# Patient Record
Sex: MALE | Race: BLACK OR AFRICAN AMERICAN | NOT HISPANIC OR LATINO | URBAN - METROPOLITAN AREA
[De-identification: names, ages, dates, MRNs, and addresses within clinical notes are randomized per-mention and may not be internally consistent; named-entity substitution may affect disease eponyms.]

---

## 2017-04-13 ENCOUNTER — OFFICE (OUTPATIENT)
Dept: URBAN - METROPOLITAN AREA CLINIC 11 | Facility: CLINIC | Age: 37
End: 2017-04-13

## 2017-04-13 VITALS
HEART RATE: 57 BPM | HEIGHT: 70 IN | RESPIRATION RATE: 16 BRPM | SYSTOLIC BLOOD PRESSURE: 132 MMHG | DIASTOLIC BLOOD PRESSURE: 80 MMHG | WEIGHT: 176 LBS

## 2017-04-13 DIAGNOSIS — R10.12 LEFT UPPER QUADRANT PAIN: ICD-10-CM

## 2017-04-13 DIAGNOSIS — K21.9 GASTRO-ESOPHAGEAL REFLUX DISEASE WITHOUT ESOPHAGITIS: ICD-10-CM

## 2017-04-13 PROCEDURE — 36415 COLL VENOUS BLD VENIPUNCTURE: CPT | Performed by: INTERNAL MEDICINE

## 2017-04-13 PROCEDURE — 99214 OFFICE O/P EST MOD 30 MIN: CPT | Performed by: INTERNAL MEDICINE

## 2017-04-13 NOTE — SERVICEHPINOTES
He stated taht he has been having a pain in the LUQ (a focal area) that has been present for over a year.  It was worse over New Years.  He stated that he stopped drinking ETOH and it stopped for about a month.  He restarted a about a month later and the symptoms returned.  The pain is fairly constant and with a "sharp sticking" pain.  It is not changed with eating.  He has stopped drinking the pint of vodka (every 1-2 days) and the symptoms have lessened but are still present.  He has had several CTs in years past that were normal with the last CT in 2016 of his chest.  He has been on Zegerid for reflux and did not think that it helped the LUQ pain.He has had reflux for several years and ahs been on Zegerid for several years, possibly since 2009.  He has noted that he has felt full earlier than in the past.  He has not had issues with dysphagia.  He has had some regurgitation at times after eating.  He has not had any particualr foods that make the reflux worse.    He has a history of asbestos exposure in 2016 and was seen at the [facility]. His old chart in the system was reviewed with him.

## 2017-04-13 NOTE — SERVICENOTES
We discussed his LUQ pain, which seems fairly chronic, the relationship to the vodka, the history of reflux, his use of Zegerid and prior evaluations and studies.  This would seem to be muscular but with the chronic GERD (since before 2009) and association with ETOH, I would do the EGD to r/o gastritis/ulcer disease/Allan's screening. I will also add his labs and a u/s to r/o pancreatic/splenic issues.  We discussed his calcifications on prostate u/s years ago and at the time of the abdominal u/s, I would like a f/u pelvic u/s.  We discussed a f/u with urology as well.

## 2017-04-14 LAB
AMYLASE: AMYLASE,SERUM: 97 U/L (ref 28–120)
CBCI COMPLETE BLOOD COUNT W/ DIFF: ABS BASOPHILS: 0 K/UL (ref 0–0.3)
CBCI COMPLETE BLOOD COUNT W/ DIFF: ABS EOSINOPHILS: 0.2 K/UL (ref 0.05–0.5)
CBCI COMPLETE BLOOD COUNT W/ DIFF: ABS LYMPHOCYTES: 1.9 K/UL (ref 1–4)
CBCI COMPLETE BLOOD COUNT W/ DIFF: ABS MONOCYTES: 0.4 K/UL (ref 0.1–1.1)
CBCI COMPLETE BLOOD COUNT W/ DIFF: ABS NEUTROPHILS: 1.4 K/UL — LOW (ref 1.8–7)
CBCI COMPLETE BLOOD COUNT W/ DIFF: BASOPHILS: 0.8 % (ref 0–3)
CBCI COMPLETE BLOOD COUNT W/ DIFF: EOSINOPHILS: 4.5 % (ref 1–7)
CBCI COMPLETE BLOOD COUNT W/ DIFF: HEMATOCRIT: 42.6 % (ref 39–55)
CBCI COMPLETE BLOOD COUNT W/ DIFF: HEMOGLOBIN: 14.1 G/DL (ref 13–17.5)
CBCI COMPLETE BLOOD COUNT W/ DIFF: LYMPHOCYTES: 49.2 % — HIGH (ref 20–48)
CBCI COMPLETE BLOOD COUNT W/ DIFF: MCH: 28.6 PG (ref 25–35)
CBCI COMPLETE BLOOD COUNT W/ DIFF: MCHC: 33.1 % (ref 30–38)
CBCI COMPLETE BLOOD COUNT W/ DIFF: MCV: 86.4 FL (ref 78–102)
CBCI COMPLETE BLOOD COUNT W/ DIFF: MONOCYTES: 9.2 % (ref 3–14)
CBCI COMPLETE BLOOD COUNT W/ DIFF: NEUTROPHILS: 36.3 % — LOW (ref 40–70)
CBCI COMPLETE BLOOD COUNT W/ DIFF: PLATELET COUNT: 221 K/UL (ref 150–450)
CBCI COMPLETE BLOOD COUNT W/ DIFF: RBC DISTRIBUTION WIDTH: 13.5 % (ref 11.5–16)
CBCI COMPLETE BLOOD COUNT W/ DIFF: RED BLOOD CELL COUNT: 4.93 M/UL (ref 4.3–5.7)
CBCI COMPLETE BLOOD COUNT W/ DIFF: WHITE BLOOD CELL COUNT: 3.8 K/UL — LOW (ref 4–11)
COMPREHENSIVE METABOLIC PANEL: ALBUMIN: 4.3 G/DL (ref 3.5–5.2)
COMPREHENSIVE METABOLIC PANEL: ALKALINE PHOSPHATASE: 50 U/L (ref 46–118)
COMPREHENSIVE METABOLIC PANEL: CALCIUM TOTAL: 9.7 MG/DL (ref 8.5–10.5)
COMPREHENSIVE METABOLIC PANEL: CARBON DIOXIDE: 28 MEQ/L (ref 21–31)
COMPREHENSIVE METABOLIC PANEL: CHLORIDE: 104 MEQ/L (ref 96–106)
COMPREHENSIVE METABOLIC PANEL: CREATININE: 1.11 MG/DL (ref 0.8–1.4)
COMPREHENSIVE METABOLIC PANEL: FASTING/NON-FASTING: (no result)
COMPREHENSIVE METABOLIC PANEL: GLUCOSE: 80 MG/DL (ref 65–100)
COMPREHENSIVE METABOLIC PANEL: POTASSIUM: 4.7 MEQ/ML (ref 3.5–5.4)
COMPREHENSIVE METABOLIC PANEL: SGOT (AST): 28 U/L (ref 13–40)
COMPREHENSIVE METABOLIC PANEL: SGPT (ALT): 19 U/L (ref 7–52)
COMPREHENSIVE METABOLIC PANEL: SODIUM: 142 MEQ/L (ref 135–145)
COMPREHENSIVE METABOLIC PANEL: TOTAL BILIRUBIN: 0.5 MG/DL (ref 0.3–1.2)
COMPREHENSIVE METABOLIC PANEL: TOTAL PROTEIN: 7 G/DL (ref 6.4–8.3)
COMPREHENSIVE METABOLIC PANEL: UREA NITROGEN: 14 MG/DL (ref 6–20)
LIPASE: 47 U/L (ref 11–82)
SED RATE - ERYTHROCYTE SED RATE: SED RATE: 2 MM/HR

## 2017-05-04 ENCOUNTER — AMBULATORY SURGICAL CENTER (OUTPATIENT)
Dept: URBAN - METROPOLITAN AREA SURGERY 3 | Facility: SURGERY | Age: 37
End: 2017-05-04

## 2017-05-04 ENCOUNTER — OFFICE (OUTPATIENT)
Dept: URBAN - METROPOLITAN AREA CLINIC 11 | Facility: CLINIC | Age: 37
End: 2017-05-04

## 2017-05-04 VITALS
RESPIRATION RATE: 18 BRPM | HEART RATE: 63 BPM | TEMPERATURE: 97.1 F | DIASTOLIC BLOOD PRESSURE: 83 MMHG | DIASTOLIC BLOOD PRESSURE: 87 MMHG | SYSTOLIC BLOOD PRESSURE: 125 MMHG | HEART RATE: 51 BPM | OXYGEN SATURATION: 97 % | RESPIRATION RATE: 16 BRPM | RESPIRATION RATE: 16 BRPM | OXYGEN SATURATION: 98 % | HEIGHT: 70 IN | HEART RATE: 51 BPM | RESPIRATION RATE: 19 BRPM | SYSTOLIC BLOOD PRESSURE: 116 MMHG | DIASTOLIC BLOOD PRESSURE: 50 MMHG | DIASTOLIC BLOOD PRESSURE: 87 MMHG | DIASTOLIC BLOOD PRESSURE: 83 MMHG | SYSTOLIC BLOOD PRESSURE: 115 MMHG | HEART RATE: 63 BPM | RESPIRATION RATE: 18 BRPM | TEMPERATURE: 97.1 F | DIASTOLIC BLOOD PRESSURE: 58 MMHG | SYSTOLIC BLOOD PRESSURE: 116 MMHG | HEIGHT: 70 IN | WEIGHT: 175 LBS | TEMPERATURE: 97.2 F | WEIGHT: 175 LBS | SYSTOLIC BLOOD PRESSURE: 125 MMHG | RESPIRATION RATE: 19 BRPM | SYSTOLIC BLOOD PRESSURE: 139 MMHG | HEART RATE: 56 BPM | HEART RATE: 56 BPM | OXYGEN SATURATION: 100 % | SYSTOLIC BLOOD PRESSURE: 115 MMHG | OXYGEN SATURATION: 100 % | DIASTOLIC BLOOD PRESSURE: 71 MMHG | TEMPERATURE: 97.2 F | DIASTOLIC BLOOD PRESSURE: 50 MMHG | SYSTOLIC BLOOD PRESSURE: 139 MMHG | OXYGEN SATURATION: 97 % | DIASTOLIC BLOOD PRESSURE: 71 MMHG | OXYGEN SATURATION: 98 % | DIASTOLIC BLOOD PRESSURE: 58 MMHG

## 2017-05-04 DIAGNOSIS — K29.50 UNSPECIFIED CHRONIC GASTRITIS WITHOUT BLEEDING: ICD-10-CM

## 2017-05-04 DIAGNOSIS — K52.9 NONINFECTIVE GASTROENTERITIS AND COLITIS, UNSPECIFIED: ICD-10-CM

## 2017-05-04 DIAGNOSIS — B96.81 HELICOBACTER PYLORI [H. PYLORI] AS THE CAUSE OF DISEASES CLA: ICD-10-CM

## 2017-05-04 DIAGNOSIS — R10.31 RIGHT LOWER QUADRANT PAIN: ICD-10-CM

## 2017-05-04 PROBLEM — K29.70 GASTRITIS, UNSPECIFIED, WITHOUT BLEEDING: Status: ACTIVE | Noted: 2017-05-04

## 2017-05-04 PROCEDURE — 43239 EGD BIOPSY SINGLE/MULTIPLE: CPT | Performed by: INTERNAL MEDICINE

## 2017-05-04 PROCEDURE — 88342 IMHCHEM/IMCYTCHM 1ST ANTB: CPT | Performed by: INTERNAL MEDICINE

## 2017-05-04 PROCEDURE — 88305 TISSUE EXAM BY PATHOLOGIST: CPT | Performed by: INTERNAL MEDICINE

## 2017-05-04 PROCEDURE — 88313 SPECIAL STAINS GROUP 2: CPT | Performed by: INTERNAL MEDICINE

## 2017-05-04 RX ORDER — OMEPRAZOLE 40 MG/1
40 CAPSULE, DELAYED RELEASE ORAL
Qty: 30 | Refills: 3 | Status: COMPLETED
Start: 2016-06-02 | End: 2017-05-04

## 2017-05-04 RX ORDER — PANTOPRAZOLE SODIUM 40 MG/1
TABLET, DELAYED RELEASE ORAL
Qty: 60 | Refills: 1 | Status: COMPLETED
Start: 2017-05-04 | End: 2017-08-22

## 2017-08-22 ENCOUNTER — OFFICE (OUTPATIENT)
Dept: URBAN - METROPOLITAN AREA CLINIC 11 | Facility: CLINIC | Age: 37
End: 2017-08-22

## 2017-08-22 VITALS
DIASTOLIC BLOOD PRESSURE: 90 MMHG | SYSTOLIC BLOOD PRESSURE: 151 MMHG | WEIGHT: 170 LBS | HEART RATE: 40 BPM | HEIGHT: 70 IN

## 2017-08-22 DIAGNOSIS — B96.81 HELICOBACTER PYLORI [H. PYLORI] AS THE CAUSE OF DISEASES CLA: ICD-10-CM

## 2017-08-22 DIAGNOSIS — R11.0 NAUSEA: ICD-10-CM

## 2017-08-22 DIAGNOSIS — R10.12 LEFT UPPER QUADRANT PAIN: ICD-10-CM

## 2017-08-22 PROCEDURE — 99214 OFFICE O/P EST MOD 30 MIN: CPT | Performed by: INTERNAL MEDICINE

## 2017-08-22 RX ORDER — PREGABALIN 75 MG/1
CAPSULE ORAL
Qty: 60 | Refills: 2 | Status: COMPLETED
Start: 2017-08-22 | End: 2018-02-12

## 2017-08-22 NOTE — SERVICEHPINOTES
He presents for f/u of his H. pylori gastritis.  He stated that he comlpeted the antiboitics and around July 6th he started having issues with nausea.  Around that time he had his blood pressor meds increased (terazosin 2.5 to 5 daily).  The nuausea is "a really wierd feeling in my stomach".  He has not had pain with it or vomiting.  He tried taking omeprazole but it did not seem to help.  The "nuasea" is not changed with eating or activities.  The pain has been spersistent since July 6th.  He stated that he had not the issue prior to that.  He has had a sensation of a dry mouth during this time and thought it might be related to H202 rinses. He has not had headaches.  He was not certain if his vision was different or not in that he has to get his glasses replaced.  He has not had tinnitus. He has not had dizzy spells.  He stated that he did stop his Terazosin for a "week and a half" and did not note a change.  He stated that his PCP was dropping the dose. He did complete the initial H. pylori antibiotics and stated that he had a f/u breath test with his PCP.  He stated that his number was 17.  He was given another round of antibiotics but was not sure of them.  He completed this round last week. The abdominal pain in the LUQ has "always been there". It is worse with crossing his arms on his abdomen.  It has not changed with eating.  He was on neurontin years ago for it and it did not seem to help.  He staed that he did have some unprotected sex prior to the nausea but had negative STD testing.

## 2017-08-22 NOTE — SERVICENOTES
We discussed his H. pylori and the two rounds of antibiotics for his.  He will need breath testing in about 8 weeks.  We discussed clearance rates with tx for H. pylori.  As to his "constant weird nausea" feeling and chronic abdominal wall pain, we reviewed his prior eval back to before 2008. I would hold on a repeat CT as we discussed.  He has tried neurontin in the past and I would like to try Lyrica.  We dsicussed the potential side effect issues with it.  We discussed the potential of depression/anxiety issues but he denied any difficulties.

## 2018-02-12 ENCOUNTER — OFFICE (OUTPATIENT)
Dept: URBAN - METROPOLITAN AREA CLINIC 11 | Facility: CLINIC | Age: 38
End: 2018-02-12

## 2018-02-12 VITALS
WEIGHT: 171 LBS | HEIGHT: 70 IN | HEART RATE: 49 BPM | SYSTOLIC BLOOD PRESSURE: 146 MMHG | DIASTOLIC BLOOD PRESSURE: 89 MMHG

## 2018-02-12 DIAGNOSIS — R10.12 LEFT UPPER QUADRANT PAIN: ICD-10-CM

## 2018-02-12 PROCEDURE — 99213 OFFICE O/P EST LOW 20 MIN: CPT | Performed by: INTERNAL MEDICINE

## 2018-02-12 NOTE — SERVICENOTES
We discussed his issues with abdominal pain, and that by hx this still seems to be muscular/wall related.  However, with the continue pain, I would do the Ct to r/o a pancreatic/splenic/bowel related issue.  If this is negative, we discussed a referral for accupuncture and possibly sports medicine/PT.  We reviewed his labs and last creatinine of 1.1.

## 2018-02-12 NOTE — SERVICEHPINOTES
He stated that he has still had the pain the in the LUQ.  The pain has had a sticking quality.  He stated that the pain is constant and unchanging.  He has noted that pressure to the area can increase it.   The pain has been present for over a year. He did try the lyrica but only took it for about 15 days in that it made him feel weird.The pain is not associated with eating or GI activities.  He did have an EGD with a single erosion last May.His labs and reports from [place] are noted.

## 2021-11-02 ENCOUNTER — OFFICE (OUTPATIENT)
Dept: URBAN - METROPOLITAN AREA CLINIC 11 | Facility: CLINIC | Age: 41
End: 2021-11-02

## 2021-11-02 VITALS
HEIGHT: 70 IN | SYSTOLIC BLOOD PRESSURE: 140 MMHG | OXYGEN SATURATION: 100 % | DIASTOLIC BLOOD PRESSURE: 85 MMHG | HEART RATE: 68 BPM | WEIGHT: 166 LBS

## 2021-11-02 DIAGNOSIS — R10.2 PELVIC AND PERINEAL PAIN: ICD-10-CM

## 2021-11-02 PROCEDURE — 99214 OFFICE O/P EST MOD 30 MIN: CPT | Performed by: INTERNAL MEDICINE

## 2021-11-02 RX ORDER — CELECOXIB 100 MG/1
CAPSULE ORAL
Qty: 30 | Refills: 1 | Status: COMPLETED
Start: 2021-11-02 | End: 2021-12-23

## 2021-11-02 NOTE — SERVICENOTES
We discussed his pelvic pain and reviewed a dif dx.  At this point, I am not certain that there is a GI component but since his last CT was in August and he has still had issues, we can relook at a CT and UA.  We discussed possible STD issue though he had been on antibiotics which should cover most issues.  We also discussed hip pain, prostate issues, chornic cystitis/interstitial cystitis, proctitis, etc..  I will have him try Celebrex as well.

## 2021-11-02 NOTE — SERVICEHPINOTES
He presents for evaluation of a lower abdominal pain.  He stated that it has been present for about a month.  He motions to his lower abdomen and mostly anterior pelvis.  He had frequent urination when it started including nocturia but after about 2 weeks it returned to normal.  He stated that it is an aching pain. It started about 4-6 weeks ago.  It has been fairly consistent.  He has not had normal stools and no diarrhea.  He has not had blood in his stools.  He has not had blood in his urine.  He thought that it was worse with bending and walking and lying on his side with his legs together.  He had a CT while in  [place], [place] that he thought it was normal. He was seen by urology and had a scope that was normal in [place]. He was tested for STDs and was negative.  He was treated imperically with Doxy and Cipro. 
br
br He has not had fevers, chills or night sweats.  
br
christopher He has stopped his exercise routine but did not recall and injury.

## 2023-02-03 ENCOUNTER — OFFICE (OUTPATIENT)
Dept: URBAN - METROPOLITAN AREA CLINIC 11 | Facility: CLINIC | Age: 43
End: 2023-02-03

## 2023-02-03 VITALS
HEART RATE: 52 BPM | WEIGHT: 169 LBS | SYSTOLIC BLOOD PRESSURE: 123 MMHG | DIASTOLIC BLOOD PRESSURE: 78 MMHG | OXYGEN SATURATION: 100 % | HEIGHT: 70 IN

## 2023-02-03 DIAGNOSIS — R10.12 LEFT UPPER QUADRANT PAIN: ICD-10-CM

## 2023-02-03 DIAGNOSIS — R19.7 DIARRHEA, UNSPECIFIED: ICD-10-CM

## 2023-02-03 PROCEDURE — 99214 OFFICE O/P EST MOD 30 MIN: CPT | Performed by: NURSE PRACTITIONER

## 2023-02-03 RX ORDER — SUCRALFATE 1 G/1
TABLET ORAL
Qty: 90 | Refills: 1 | Status: COMPLETED
Start: 2023-02-03 | End: 2023-03-23

## 2023-02-03 RX ORDER — OMEPRAZOLE 40 MG/1
80 CAPSULE, DELAYED RELEASE ORAL
Qty: 60 | Refills: 3 | Status: ACTIVE
Start: 2023-02-03

## 2023-02-03 NOTE — SERVICENOTES
We reviewed his coinciding LUQ pain and diarrhea that presented last week. It does sound to be an acute infectious issue, but I would like to treat for other potential differentials including gastritis, ulcers, erosions etc. Will check labs today as well and have him follow up in 6 weeks. If discomfort continues I would like to proceed with EGD vs. testing for h pylori given his history of h pylori.

## 2023-02-03 NOTE — SERVICEHPINOTES
He has been having LUQ pain that presented approximately 1 week ago that radiated into the back. The pain is constant and characterized as sharp. It seems to be a little worse if he is in a slumped position or driving. He has been avoiding food d/t diarrhea. Stools is a light brown. Aggravating factors includes eating with urgent loose stools that would occur pretty soon after eating. No nocturnal stools. Last night he began changing to a softer darker stool. He is currently having a bowel movement once or twice every morning. He took Zegrid and Pepto Bismol without relief. He denies presence of fever or chills. No sick contacts. No use of NSAIDs.

## 2023-02-04 LAB
CBC, PLATELET, NO DIFFERENTIAL: HEMATOCRIT: 42.6 % (ref 37.5–51)
CBC, PLATELET, NO DIFFERENTIAL: HEMOGLOBIN: 14.2 G/DL (ref 13–17.7)
CBC, PLATELET, NO DIFFERENTIAL: MCH: 29.1 PG (ref 26.6–33)
CBC, PLATELET, NO DIFFERENTIAL: MCHC: 33.3 G/DL (ref 31.5–35.7)
CBC, PLATELET, NO DIFFERENTIAL: MCV: 87 FL (ref 79–97)
CBC, PLATELET, NO DIFFERENTIAL: PLATELETS: 272 X10E3/UL (ref 150–450)
CBC, PLATELET, NO DIFFERENTIAL: RBC: 4.88 X10E6/UL (ref 4.14–5.8)
CBC, PLATELET, NO DIFFERENTIAL: RDW: 13.3 % (ref 11.6–15.4)
CBC, PLATELET, NO DIFFERENTIAL: WBC: 3.2 X10E3/UL — LOW (ref 3.4–10.8)
COMP. METABOLIC PANEL (14): A/G RATIO: 1.6 (ref 1.2–2.2)
COMP. METABOLIC PANEL (14): ALBUMIN: 4.6 G/DL (ref 4–5)
COMP. METABOLIC PANEL (14): ALKALINE PHOSPHATASE: 51 IU/L (ref 44–121)
COMP. METABOLIC PANEL (14): ALT (SGPT): 14 IU/L (ref 0–44)
COMP. METABOLIC PANEL (14): AST (SGOT): 15 IU/L (ref 0–40)
COMP. METABOLIC PANEL (14): BILIRUBIN, TOTAL: 0.4 MG/DL (ref 0–1.2)
COMP. METABOLIC PANEL (14): BUN/CREATININE RATIO: 14 (ref 9–20)
COMP. METABOLIC PANEL (14): BUN: 16 MG/DL (ref 6–24)
COMP. METABOLIC PANEL (14): CALCIUM: 10.2 MG/DL (ref 8.7–10.2)
COMP. METABOLIC PANEL (14): CARBON DIOXIDE, TOTAL: 28 MMOL/L (ref 20–29)
COMP. METABOLIC PANEL (14): CHLORIDE: 100 MMOL/L (ref 96–106)
COMP. METABOLIC PANEL (14): CREATININE: 1.17 MG/DL (ref 0.76–1.27)
COMP. METABOLIC PANEL (14): EGFR: 80 ML/MIN/1.73 (ref 59–?)
COMP. METABOLIC PANEL (14): GLOBULIN, TOTAL: 2.9 G/DL (ref 1.5–4.5)
COMP. METABOLIC PANEL (14): GLUCOSE: 90 MG/DL (ref 70–99)
COMP. METABOLIC PANEL (14): POTASSIUM: 4 MMOL/L (ref 3.5–5.2)
COMP. METABOLIC PANEL (14): PROTEIN, TOTAL: 7.5 G/DL (ref 6–8.5)
COMP. METABOLIC PANEL (14): SODIUM: 143 MMOL/L (ref 134–144)
LIPASE: 69 U/L (ref 13–78)

## 2023-02-06 LAB
GI PROFILE, STOOL, PCR: ADENOVIRUS F 40/41: NOT DETECTED
GI PROFILE, STOOL, PCR: ASTROVIRUS: NOT DETECTED
GI PROFILE, STOOL, PCR: C DIFFICILE TOXIN A/B: DETECTED
GI PROFILE, STOOL, PCR: CAMPYLOBACTER: NOT DETECTED
GI PROFILE, STOOL, PCR: CRYPTOSPORIDIUM: NOT DETECTED
GI PROFILE, STOOL, PCR: CYCLOSPORA CAYETANENSIS: NOT DETECTED
GI PROFILE, STOOL, PCR: E COLI O157: NOT DETECTED
GI PROFILE, STOOL, PCR: ENTAMOEBA HISTOLYTICA: NOT DETECTED
GI PROFILE, STOOL, PCR: ENTEROAGGREGATIVE E COLI: NOT DETECTED
GI PROFILE, STOOL, PCR: ENTEROPATHOGENIC E COLI: (no result)
GI PROFILE, STOOL, PCR: ENTEROTOXIGENIC E COLI: NOT DETECTED
GI PROFILE, STOOL, PCR: GIARDIA LAMBLIA: NOT DETECTED
GI PROFILE, STOOL, PCR: NOROVIRUS GI/GII: NOT DETECTED
GI PROFILE, STOOL, PCR: PLESIOMONAS SHIGELLOIDES: NOT DETECTED
GI PROFILE, STOOL, PCR: ROTAVIRUS A: NOT DETECTED
GI PROFILE, STOOL, PCR: SALMONELLA: NOT DETECTED
GI PROFILE, STOOL, PCR: SAPOVIRUS: NOT DETECTED
GI PROFILE, STOOL, PCR: SHIGA-TOXIN-PRODUCING E COLI: DETECTED
GI PROFILE, STOOL, PCR: SHIGELLA/ENTEROINVASIVE E COLI: NOT DETECTED
GI PROFILE, STOOL, PCR: VIBRIO CHOLERAE: NOT DETECTED
GI PROFILE, STOOL, PCR: VIBRIO: NOT DETECTED
GI PROFILE, STOOL, PCR: YERSINIA ENTEROCOLITICA: NOT DETECTED

## 2023-02-21 ENCOUNTER — OFFICE (OUTPATIENT)
Dept: URBAN - METROPOLITAN AREA CLINIC 11 | Facility: CLINIC | Age: 43
End: 2023-02-21

## 2023-02-21 VITALS
WEIGHT: 172 LBS | HEIGHT: 70 IN | OXYGEN SATURATION: 99 % | HEART RATE: 64 BPM | DIASTOLIC BLOOD PRESSURE: 76 MMHG | SYSTOLIC BLOOD PRESSURE: 119 MMHG

## 2023-02-21 DIAGNOSIS — R10.12 LEFT UPPER QUADRANT PAIN: ICD-10-CM

## 2023-02-21 DIAGNOSIS — R19.7 DIARRHEA, UNSPECIFIED: ICD-10-CM

## 2023-02-21 PROCEDURE — 99214 OFFICE O/P EST MOD 30 MIN: CPT | Performed by: NURSE PRACTITIONER

## 2023-02-21 NOTE — SERVICEHPINOTES
Mr. Castorena presents today for abdominal pain in the left upper abdomen that has continued. He notes that it improved for a couple of days after he was previously seen. He then ate a steak and the pain returned. Pain is constant and sharp. No particular aggravating factors. He denies worsening of abdominal pain with eating or with having a bowel movement. He often notes it when he is laying down and will press under the left costal margin. He was previously treated for presence of c diff, and he is having very soft bowel movements with a bowel movement approximately once or twice a day. No nocturnal stools. No fever or chills. He recently started taking Probiotics yesterday. He continues to take Omeprazole daily. He previously took Carafate with no improvement. He denies retrosternal burning, regurgitation, dysphagia, nausea, vomiting or melena.

## 2023-02-21 NOTE — SERVICENOTES
We reviewed his continued to have abdominal pain in the LUQ with unremarkable labs and no improvement thus far with PPI therapy. I would like to get a CT at this time. If unremarkable will plan for EGD. Since he is not yet having formed stool will plan for follow up stool studies.

## 2023-02-23 LAB

## 2023-03-17 ENCOUNTER — OFFICE (OUTPATIENT)
Dept: URBAN - METROPOLITAN AREA CLINIC 11 | Facility: CLINIC | Age: 43
End: 2023-03-17
Payer: COMMERCIAL

## 2023-03-17 VITALS
OXYGEN SATURATION: 99 % | WEIGHT: 167 LBS | HEIGHT: 70 IN | DIASTOLIC BLOOD PRESSURE: 63 MMHG | SYSTOLIC BLOOD PRESSURE: 116 MMHG | HEART RATE: 60 BPM

## 2023-03-17 DIAGNOSIS — D64.9 ANEMIA, UNSPECIFIED: ICD-10-CM

## 2023-03-17 DIAGNOSIS — Z83.71 FAMILY HISTORY OF COLONIC POLYPS: ICD-10-CM

## 2023-03-17 DIAGNOSIS — R10.12 LEFT UPPER QUADRANT PAIN: ICD-10-CM

## 2023-03-17 PROCEDURE — 99214 OFFICE O/P EST MOD 30 MIN: CPT | Performed by: NURSE PRACTITIONER

## 2023-03-17 NOTE — SERVICEHPINOTES
Mr. Castorena is a 42 year old male that presents today for follow up of LUQ pain. He continues to take Omeprazole that was prescribed initially. He was recently prescribed Pepcid by Mary Ramirez on Sunday with lack of improvement. Pain is constant and dull. He denies particular aggravating factors including eating, positional changes, having a bowel movement. No nausea or vomiting. No regurgitation, retrosternal burning, dysphagia etc. He notes that he does have a decrease in appetite. He currently has a bowel movement daily every morning. Stools are formed and soft. No hematochezia or melena. br
br Labs at time of hospital Hcty 39.3, Hgb 13.6br
br

## 2023-03-18 LAB
CBC, PLATELET, NO DIFFERENTIAL: HEMATOCRIT: 41.7 % (ref 37.5–51)
CBC, PLATELET, NO DIFFERENTIAL: HEMOGLOBIN: 13.9 G/DL (ref 13–17.7)
CBC, PLATELET, NO DIFFERENTIAL: MCH: 28.9 PG (ref 26.6–33)
CBC, PLATELET, NO DIFFERENTIAL: MCHC: 33.3 G/DL (ref 31.5–35.7)
CBC, PLATELET, NO DIFFERENTIAL: MCV: 87 FL (ref 79–97)
CBC, PLATELET, NO DIFFERENTIAL: PLATELETS: 244 X10E3/UL (ref 150–450)
CBC, PLATELET, NO DIFFERENTIAL: RBC: 4.81 X10E6/UL (ref 4.14–5.8)
CBC, PLATELET, NO DIFFERENTIAL: RDW: 13.3 % (ref 11.6–15.4)
CBC, PLATELET, NO DIFFERENTIAL: WBC: 3.8 X10E3/UL (ref 3.4–10.8)
IRON AND TIBC: IRON BIND.CAP.(TIBC): 304 UG/DL (ref 250–450)
IRON AND TIBC: IRON SATURATION: 30 % (ref 15–55)
IRON AND TIBC: IRON: 92 UG/DL (ref 38–169)
IRON AND TIBC: UIBC: 212 UG/DL (ref 111–343)

## 2023-03-23 ENCOUNTER — OFFICE (OUTPATIENT)
Dept: URBAN - METROPOLITAN AREA PATHOLOGY 22 | Facility: PATHOLOGY | Age: 43
End: 2023-03-23

## 2023-03-23 ENCOUNTER — AMBULATORY SURGICAL CENTER (OUTPATIENT)
Dept: URBAN - METROPOLITAN AREA SURGERY 3 | Facility: SURGERY | Age: 43
End: 2023-03-23
Payer: COMMERCIAL

## 2023-03-23 ENCOUNTER — AMBULATORY SURGICAL CENTER (OUTPATIENT)
Dept: URBAN - METROPOLITAN AREA SURGERY 3 | Facility: SURGERY | Age: 43
End: 2023-03-23

## 2023-03-23 VITALS
HEIGHT: 70 IN | DIASTOLIC BLOOD PRESSURE: 67 MMHG | DIASTOLIC BLOOD PRESSURE: 39 MMHG | OXYGEN SATURATION: 99 % | SYSTOLIC BLOOD PRESSURE: 119 MMHG | SYSTOLIC BLOOD PRESSURE: 90 MMHG | DIASTOLIC BLOOD PRESSURE: 67 MMHG | HEART RATE: 80 BPM | OXYGEN SATURATION: 98 % | DIASTOLIC BLOOD PRESSURE: 69 MMHG | HEART RATE: 80 BPM | HEART RATE: 84 BPM | HEIGHT: 70 IN | DIASTOLIC BLOOD PRESSURE: 75 MMHG | OXYGEN SATURATION: 98 % | DIASTOLIC BLOOD PRESSURE: 39 MMHG | SYSTOLIC BLOOD PRESSURE: 108 MMHG | WEIGHT: 175 LBS | SYSTOLIC BLOOD PRESSURE: 104 MMHG | RESPIRATION RATE: 16 BRPM | HEART RATE: 88 BPM | OXYGEN SATURATION: 100 % | HEART RATE: 79 BPM | RESPIRATION RATE: 16 BRPM | HEART RATE: 84 BPM | HEART RATE: 88 BPM | SYSTOLIC BLOOD PRESSURE: 108 MMHG | DIASTOLIC BLOOD PRESSURE: 78 MMHG | HEART RATE: 96 BPM | TEMPERATURE: 98.4 F | RESPIRATION RATE: 16 BRPM | SYSTOLIC BLOOD PRESSURE: 102 MMHG | SYSTOLIC BLOOD PRESSURE: 108 MMHG | DIASTOLIC BLOOD PRESSURE: 39 MMHG | WEIGHT: 175 LBS | SYSTOLIC BLOOD PRESSURE: 102 MMHG | RESPIRATION RATE: 18 BRPM | DIASTOLIC BLOOD PRESSURE: 78 MMHG | SYSTOLIC BLOOD PRESSURE: 90 MMHG | HEART RATE: 88 BPM | DIASTOLIC BLOOD PRESSURE: 69 MMHG | OXYGEN SATURATION: 99 % | HEART RATE: 96 BPM | OXYGEN SATURATION: 99 % | TEMPERATURE: 98.4 F | SYSTOLIC BLOOD PRESSURE: 90 MMHG | WEIGHT: 175 LBS | SYSTOLIC BLOOD PRESSURE: 104 MMHG | SYSTOLIC BLOOD PRESSURE: 119 MMHG | DIASTOLIC BLOOD PRESSURE: 67 MMHG | HEART RATE: 79 BPM | DIASTOLIC BLOOD PRESSURE: 75 MMHG | HEART RATE: 96 BPM | HEIGHT: 70 IN | DIASTOLIC BLOOD PRESSURE: 75 MMHG | SYSTOLIC BLOOD PRESSURE: 104 MMHG | RESPIRATION RATE: 18 BRPM | RESPIRATION RATE: 18 BRPM | HEART RATE: 79 BPM | OXYGEN SATURATION: 98 % | HEART RATE: 84 BPM | HEART RATE: 80 BPM | DIASTOLIC BLOOD PRESSURE: 78 MMHG | OXYGEN SATURATION: 100 % | DIASTOLIC BLOOD PRESSURE: 69 MMHG | OXYGEN SATURATION: 100 % | TEMPERATURE: 98.4 F | SYSTOLIC BLOOD PRESSURE: 102 MMHG | SYSTOLIC BLOOD PRESSURE: 119 MMHG

## 2023-03-23 DIAGNOSIS — Z12.11 ENCOUNTER FOR SCREENING FOR MALIGNANT NEOPLASM OF COLON: ICD-10-CM

## 2023-03-23 DIAGNOSIS — K29.50 UNSPECIFIED CHRONIC GASTRITIS WITHOUT BLEEDING: ICD-10-CM

## 2023-03-23 DIAGNOSIS — B96.81 HELICOBACTER PYLORI [H. PYLORI] AS THE CAUSE OF DISEASES CLA: ICD-10-CM

## 2023-03-23 DIAGNOSIS — R10.12 LEFT UPPER QUADRANT PAIN: ICD-10-CM

## 2023-03-23 DIAGNOSIS — Z83.71 FAMILY HISTORY OF COLONIC POLYPS: ICD-10-CM

## 2023-03-23 PROBLEM — K31.89 OTHER DISEASES OF STOMACH AND DUODENUM: Status: ACTIVE | Noted: 2023-03-23

## 2023-03-23 PROCEDURE — G0105 COLORECTAL SCRN; HI RISK IND: HCPCS | Performed by: INTERNAL MEDICINE

## 2023-03-23 PROCEDURE — 45378 DIAGNOSTIC COLONOSCOPY: CPT | Performed by: INTERNAL MEDICINE

## 2023-03-23 PROCEDURE — 43239 EGD BIOPSY SINGLE/MULTIPLE: CPT | Mod: 51 | Performed by: INTERNAL MEDICINE

## 2023-03-23 RX ORDER — OMEPRAZOLE 40 MG/1
80 CAPSULE, DELAYED RELEASE ORAL
Qty: 60 | Refills: 3 | Status: ACTIVE
Start: 2023-02-03

## 2023-03-23 RX ORDER — SUCRALFATE 1 G/1
TABLET ORAL
Qty: 90 | Refills: 1 | Status: COMPLETED
Start: 2023-02-03 | End: 2023-03-23

## 2023-05-12 ENCOUNTER — OFFICE (OUTPATIENT)
Dept: URBAN - METROPOLITAN AREA CLINIC 11 | Facility: CLINIC | Age: 43
End: 2023-05-12

## 2023-05-12 VITALS
DIASTOLIC BLOOD PRESSURE: 76 MMHG | OXYGEN SATURATION: 100 % | SYSTOLIC BLOOD PRESSURE: 113 MMHG | HEART RATE: 70 BPM | HEIGHT: 70 IN | WEIGHT: 163 LBS

## 2023-05-12 DIAGNOSIS — A04.8 OTHER SPECIFIED BACTERIAL INTESTINAL INFECTIONS: ICD-10-CM

## 2023-05-12 PROCEDURE — 99214 OFFICE O/P EST MOD 30 MIN: CPT | Performed by: NURSE PRACTITIONER

## 2023-05-12 RX ORDER — LACTOBACILLUS CASEI/FOLIC ACID 60-1.25 MG
60 CAPSULE ORAL
Qty: 48 | Refills: 0 | Status: COMPLETED
Start: 2023-05-12 | End: 2023-07-07

## 2023-05-12 RX ORDER — OMEPRAZOLE MAGNESIUM, AMOXICILLIN AND RIFABUTIN 10; 250; 12.5 MG/1; MG/1; MG/1
CAPSULE, DELAYED RELEASE ORAL
Qty: 168 | Refills: 0 | Status: ACTIVE
Start: 2023-05-12

## 2023-05-12 NOTE — SERVICENOTES
We reviewed his pathology results with presence of h pylori and need for treatment. Since he has had this in the past and was treated, we will need to try a different treatment regimen. Will prescribe Talicia at this time. Will follow back up in office in 8 weeks to retest.

## 2023-05-12 NOTE — SERVICEHPINOTES
Mr. Castorena presents today for follow up after EGD/Colonoscopy. EGD with erosions and erythema in stomach body and antrum, shallow ulcerations in duodenal bulb. Pathology remarkable for h pylori. He has a history of h pylori in 2017 that was treated with Amoxicillin and Clarithromycin. He was not retested at that time. He continues to have LUQ discomfort and decreased appetite. Pain is constant and dull. There are no particular aggravating factors including eating, positional changes, or with having a bowel movement. He denies presence of nausea or vomiting. No presence of regurgitation, retrosternal burning, dysphagia, etc. He is currently having loose stools once or twice per day.

## 2023-07-07 ENCOUNTER — OFFICE (OUTPATIENT)
Dept: URBAN - METROPOLITAN AREA CLINIC 11 | Facility: CLINIC | Age: 43
End: 2023-07-07

## 2023-07-07 VITALS
HEIGHT: 70 IN | DIASTOLIC BLOOD PRESSURE: 73 MMHG | SYSTOLIC BLOOD PRESSURE: 116 MMHG | HEART RATE: 52 BPM | OXYGEN SATURATION: 100 % | WEIGHT: 164 LBS

## 2023-07-07 DIAGNOSIS — K21.9 GASTRO-ESOPHAGEAL REFLUX DISEASE WITHOUT ESOPHAGITIS: ICD-10-CM

## 2023-07-07 DIAGNOSIS — A04.8 OTHER SPECIFIED BACTERIAL INTESTINAL INFECTIONS: ICD-10-CM

## 2023-07-07 PROCEDURE — 99213 OFFICE O/P EST LOW 20 MIN: CPT | Performed by: NURSE PRACTITIONER

## 2023-07-07 NOTE — SERVICEHPINOTES
Mr. Castorena presents today for follow up of h pylori treatment. He completed the medication regimen last night. He notes that the LUQ discomfort has improved some. It comes and goes as opposed to constantly previously. It is characterized as a dull ache. He is currently having a bowel movement twice per day. Stools are sometimes loose since treatment for h pylori. He denies presence of retrosternal burning, regurgitation, nausea, vomiting, dysphagia etc. He continues to take Omeprazole once per day.

## 2023-07-07 NOTE — SERVICENOTES
Since he just completed his h pylori medication last night, we will need to wait at least 4 weeks for breath testing. I will call him with results. He has had some improvement of his LUQ discomfort. Will determine follow up based on h pylori testing results.

## 2025-02-12 ENCOUNTER — OFFICE (OUTPATIENT)
Dept: URBAN - METROPOLITAN AREA PATHOLOGY 12 | Facility: PATHOLOGY | Age: 45
End: 2025-02-12

## 2025-02-12 ENCOUNTER — AMBULATORY SURGICAL CENTER (OUTPATIENT)
Dept: URBAN - METROPOLITAN AREA SURGERY 3 | Facility: SURGERY | Age: 45
End: 2025-02-12

## 2025-02-12 VITALS
RESPIRATION RATE: 20 BRPM | DIASTOLIC BLOOD PRESSURE: 66 MMHG | OXYGEN SATURATION: 100 % | SYSTOLIC BLOOD PRESSURE: 113 MMHG | OXYGEN SATURATION: 99 % | SYSTOLIC BLOOD PRESSURE: 110 MMHG | HEART RATE: 5 BPM | SYSTOLIC BLOOD PRESSURE: 113 MMHG | WEIGHT: 167.2 LBS | HEIGHT: 70 IN | SYSTOLIC BLOOD PRESSURE: 108 MMHG | TEMPERATURE: 98.2 F | SYSTOLIC BLOOD PRESSURE: 108 MMHG | HEART RATE: 57 BPM | HEART RATE: 52 BPM | SYSTOLIC BLOOD PRESSURE: 112 MMHG | DIASTOLIC BLOOD PRESSURE: 84 MMHG | OXYGEN SATURATION: 99 % | RESPIRATION RATE: 15 BRPM | TEMPERATURE: 97.5 F | RESPIRATION RATE: 20 BRPM | HEART RATE: 57 BPM | SYSTOLIC BLOOD PRESSURE: 106 MMHG | DIASTOLIC BLOOD PRESSURE: 68 MMHG | RESPIRATION RATE: 18 BRPM | RESPIRATION RATE: 16 BRPM | HEART RATE: 69 BPM | OXYGEN SATURATION: 100 % | RESPIRATION RATE: 18 BRPM | SYSTOLIC BLOOD PRESSURE: 110 MMHG | HEART RATE: 52 BPM | DIASTOLIC BLOOD PRESSURE: 65 MMHG | DIASTOLIC BLOOD PRESSURE: 84 MMHG | SYSTOLIC BLOOD PRESSURE: 106 MMHG | SYSTOLIC BLOOD PRESSURE: 112 MMHG | RESPIRATION RATE: 15 BRPM | DIASTOLIC BLOOD PRESSURE: 66 MMHG | HEART RATE: 77 BPM | RESPIRATION RATE: 16 BRPM | DIASTOLIC BLOOD PRESSURE: 65 MMHG | HEART RATE: 5 BPM | HEART RATE: 69 BPM | TEMPERATURE: 97.5 F | TEMPERATURE: 98.2 F | HEIGHT: 70 IN | WEIGHT: 167.2 LBS | DIASTOLIC BLOOD PRESSURE: 68 MMHG | HEART RATE: 77 BPM

## 2025-02-12 DIAGNOSIS — K31.89 OTHER DISEASES OF STOMACH AND DUODENUM: ICD-10-CM

## 2025-02-12 DIAGNOSIS — R10.12 LEFT UPPER QUADRANT PAIN: ICD-10-CM

## 2025-02-12 DIAGNOSIS — K29.30 CHRONIC SUPERFICIAL GASTRITIS WITHOUT BLEEDING: ICD-10-CM

## 2025-02-12 DIAGNOSIS — B96.81 HELICOBACTER PYLORI [H. PYLORI] AS THE CAUSE OF DISEASES CLA: ICD-10-CM

## 2025-02-12 PROCEDURE — 88342 IMHCHEM/IMCYTCHM 1ST ANTB: CPT | Performed by: STUDENT IN AN ORGANIZED HEALTH CARE EDUCATION/TRAINING PROGRAM

## 2025-02-12 PROCEDURE — 88305 TISSUE EXAM BY PATHOLOGIST: CPT | Performed by: STUDENT IN AN ORGANIZED HEALTH CARE EDUCATION/TRAINING PROGRAM

## 2025-02-12 PROCEDURE — 88313 SPECIAL STAINS GROUP 2: CPT | Performed by: STUDENT IN AN ORGANIZED HEALTH CARE EDUCATION/TRAINING PROGRAM

## 2025-02-12 PROCEDURE — 43239 EGD BIOPSY SINGLE/MULTIPLE: CPT | Performed by: INTERNAL MEDICINE

## 2025-02-12 RX ORDER — PANTOPRAZOLE 40 MG/1
40 TABLET, DELAYED RELEASE ORAL
Qty: 90 | Refills: 1 | Status: ACTIVE
Start: 2025-02-12

## 2025-02-14 LAB
GASTRO ONE PATHOLOGY: PDF REPORT: (no result)
GASTRO ONE PATHOLOGY: PDF REPORT: (no result)

## 2025-04-01 ENCOUNTER — OFFICE (OUTPATIENT)
Dept: URBAN - METROPOLITAN AREA CLINIC 11 | Facility: CLINIC | Age: 45
End: 2025-04-01
Payer: COMMERCIAL

## 2025-04-01 VITALS
SYSTOLIC BLOOD PRESSURE: 144 MMHG | OXYGEN SATURATION: 100 % | HEIGHT: 70 IN | HEART RATE: 52 BPM | WEIGHT: 163 LBS | SYSTOLIC BLOOD PRESSURE: 139 MMHG | DIASTOLIC BLOOD PRESSURE: 94 MMHG | DIASTOLIC BLOOD PRESSURE: 90 MMHG

## 2025-04-01 DIAGNOSIS — B96.81 HELICOBACTER PYLORI [H. PYLORI] AS THE CAUSE OF DISEASES CLA: ICD-10-CM

## 2025-04-01 PROCEDURE — 99214 OFFICE O/P EST MOD 30 MIN: CPT | Performed by: INTERNAL MEDICINE

## 2025-04-01 RX ORDER — AMITRIPTYLINE HYDROCHLORIDE 25 MG/1
25 TABLET, FILM COATED ORAL
Qty: 30 | Refills: 3 | Status: ACTIVE
Start: 2025-04-01

## 2025-06-03 ENCOUNTER — OFFICE (OUTPATIENT)
Dept: URBAN - METROPOLITAN AREA CLINIC 11 | Facility: CLINIC | Age: 45
End: 2025-06-03
Payer: COMMERCIAL

## 2025-06-03 VITALS
OXYGEN SATURATION: 99 % | SYSTOLIC BLOOD PRESSURE: 137 MMHG | DIASTOLIC BLOOD PRESSURE: 83 MMHG | HEART RATE: 57 BPM | DIASTOLIC BLOOD PRESSURE: 90 MMHG | SYSTOLIC BLOOD PRESSURE: 129 MMHG | HEIGHT: 70 IN | WEIGHT: 171 LBS

## 2025-06-03 DIAGNOSIS — R10.12 LEFT UPPER QUADRANT PAIN: ICD-10-CM

## 2025-06-03 DIAGNOSIS — Z11.0 ENCOUNTER FOR SCREENING FOR INTESTINAL INFECTIOUS DISEASES: ICD-10-CM

## 2025-06-03 DIAGNOSIS — A04.8 OTHER SPECIFIED BACTERIAL INTESTINAL INFECTIONS: ICD-10-CM

## 2025-06-03 PROCEDURE — 99214 OFFICE O/P EST MOD 30 MIN: CPT | Performed by: INTERNAL MEDICINE

## 2025-07-09 ENCOUNTER — OFFICE (OUTPATIENT)
Dept: URBAN - METROPOLITAN AREA CLINIC 11 | Facility: CLINIC | Age: 45
End: 2025-07-09
Payer: COMMERCIAL

## 2025-07-09 VITALS
HEIGHT: 70 IN | SYSTOLIC BLOOD PRESSURE: 115 MMHG | WEIGHT: 173 LBS | DIASTOLIC BLOOD PRESSURE: 68 MMHG | OXYGEN SATURATION: 98 % | HEART RATE: 57 BPM

## 2025-07-09 DIAGNOSIS — R19.7 DIARRHEA, UNSPECIFIED: ICD-10-CM

## 2025-07-09 DIAGNOSIS — A04.8 OTHER SPECIFIED BACTERIAL INTESTINAL INFECTIONS: ICD-10-CM

## 2025-07-09 DIAGNOSIS — K30 FUNCTIONAL DYSPEPSIA: ICD-10-CM

## 2025-07-09 PROCEDURE — 99213 OFFICE O/P EST LOW 20 MIN: CPT | Performed by: NURSE PRACTITIONER

## 2025-07-11 LAB
C DIFFICILE TOXIN GENE NAA: NEGATIVE
C DIFFICILE TOXINS A+B, EIA: NEGATIVE

## 2025-08-22 ENCOUNTER — OFFICE (OUTPATIENT)
Dept: URBAN - METROPOLITAN AREA CLINIC 11 | Facility: CLINIC | Age: 45
End: 2025-08-22
Payer: COMMERCIAL

## 2025-08-22 VITALS
HEART RATE: 56 BPM | OXYGEN SATURATION: 100 % | WEIGHT: 173 LBS | SYSTOLIC BLOOD PRESSURE: 131 MMHG | SYSTOLIC BLOOD PRESSURE: 132 MMHG | DIASTOLIC BLOOD PRESSURE: 76 MMHG | DIASTOLIC BLOOD PRESSURE: 84 MMHG | HEIGHT: 70 IN

## 2025-08-22 DIAGNOSIS — K58.9 IRRITABLE BOWEL SYNDROME, UNSPECIFIED: ICD-10-CM

## 2025-08-22 DIAGNOSIS — Z11.0 ENCOUNTER FOR SCREENING FOR INTESTINAL INFECTIOUS DISEASES: ICD-10-CM

## 2025-08-22 DIAGNOSIS — A04.8 OTHER SPECIFIED BACTERIAL INTESTINAL INFECTIONS: ICD-10-CM

## 2025-08-22 PROCEDURE — 99214 OFFICE O/P EST MOD 30 MIN: CPT | Performed by: INTERNAL MEDICINE

## 2025-08-22 RX ORDER — DICYCLOMINE HYDROCHLORIDE 10 MG/1
10 CAPSULE ORAL
Qty: 30 | Refills: 3 | Status: ACTIVE
Start: 2025-08-22